# Patient Record
Sex: FEMALE | Race: WHITE | NOT HISPANIC OR LATINO
[De-identification: names, ages, dates, MRNs, and addresses within clinical notes are randomized per-mention and may not be internally consistent; named-entity substitution may affect disease eponyms.]

---

## 2017-12-18 PROBLEM — Z00.00 ENCOUNTER FOR PREVENTIVE HEALTH EXAMINATION: Status: ACTIVE | Noted: 2017-12-18

## 2018-01-17 ENCOUNTER — LABORATORY RESULT (OUTPATIENT)
Age: 63
End: 2018-01-17

## 2018-01-17 ENCOUNTER — APPOINTMENT (OUTPATIENT)
Dept: SURGERY | Facility: CLINIC | Age: 63
End: 2018-01-17
Payer: COMMERCIAL

## 2018-01-17 VITALS
SYSTOLIC BLOOD PRESSURE: 134 MMHG | HEART RATE: 84 BPM | HEIGHT: 67 IN | WEIGHT: 160 LBS | DIASTOLIC BLOOD PRESSURE: 84 MMHG | BODY MASS INDEX: 25.11 KG/M2

## 2018-01-17 DIAGNOSIS — D05.12 INTRADUCTAL CARCINOMA IN SITU OF LEFT BREAST: ICD-10-CM

## 2018-01-17 DIAGNOSIS — C44.629 SQUAMOUS CELL CARCINOMA OF SKIN OF LEFT UPPER LIMB, INCLUDING SHOULDER: ICD-10-CM

## 2018-01-17 DIAGNOSIS — Z87.891 PERSONAL HISTORY OF NICOTINE DEPENDENCE: ICD-10-CM

## 2018-01-17 PROCEDURE — 10022: CPT

## 2018-01-17 PROCEDURE — 76942 ECHO GUIDE FOR BIOPSY: CPT

## 2018-01-17 PROCEDURE — 99243 OFF/OP CNSLTJ NEW/EST LOW 30: CPT

## 2018-01-17 RX ORDER — FENOFIBRATE 145 MG/1
TABLET ORAL
Refills: 0 | Status: ACTIVE | COMMUNITY

## 2018-01-17 RX ORDER — ALPRAZOLAM 2 MG/1
TABLET ORAL
Refills: 0 | Status: ACTIVE | COMMUNITY

## 2018-01-17 RX ORDER — NORTRIPTYLINE HYDROCHLORIDE 75 MG/1
CAPSULE ORAL
Refills: 0 | Status: ACTIVE | COMMUNITY

## 2018-01-17 RX ORDER — FAMOTIDINE 40 MG/1
TABLET, FILM COATED ORAL
Refills: 0 | Status: ACTIVE | COMMUNITY

## 2018-01-19 PROBLEM — C44.629 SQUAMOUS CELL CARCINOMA OF LEFT HAND: Status: RESOLVED | Noted: 2018-01-19 | Resolved: 2018-01-19

## 2018-01-19 PROBLEM — Z87.891 FORMER SMOKER: Status: ACTIVE | Noted: 2018-01-19

## 2018-01-19 PROBLEM — D05.12 DUCTAL CARCINOMA IN SITU (DCIS) OF LEFT BREAST: Status: RESOLVED | Noted: 2018-01-19 | Resolved: 2018-01-19

## 2023-07-25 ENCOUNTER — LABORATORY RESULT (OUTPATIENT)
Age: 68
End: 2023-07-25

## 2023-07-25 ENCOUNTER — APPOINTMENT (OUTPATIENT)
Dept: SURGERY | Facility: CLINIC | Age: 68
End: 2023-07-25
Payer: MEDICARE

## 2023-07-25 VITALS
HEIGHT: 67 IN | SYSTOLIC BLOOD PRESSURE: 130 MMHG | WEIGHT: 165 LBS | BODY MASS INDEX: 25.9 KG/M2 | DIASTOLIC BLOOD PRESSURE: 85 MMHG

## 2023-07-25 DIAGNOSIS — E04.9 NONTOXIC GOITER, UNSPECIFIED: ICD-10-CM

## 2023-07-25 DIAGNOSIS — E04.2 NONTOXIC MULTINODULAR GOITER: ICD-10-CM

## 2023-07-25 DIAGNOSIS — Z87.09 PERSONAL HISTORY OF OTHER DISEASES OF THE RESPIRATORY SYSTEM: ICD-10-CM

## 2023-07-25 DIAGNOSIS — Z86.79 PERSONAL HISTORY OF OTHER DISEASES OF THE CIRCULATORY SYSTEM: ICD-10-CM

## 2023-07-25 DIAGNOSIS — Z86.59 PERSONAL HISTORY OF OTHER MENTAL AND BEHAVIORAL DISORDERS: ICD-10-CM

## 2023-07-25 DIAGNOSIS — Z87.19 PERSONAL HISTORY OF OTHER DISEASES OF THE DIGESTIVE SYSTEM: ICD-10-CM

## 2023-07-25 PROCEDURE — 99203 OFFICE O/P NEW LOW 30 MIN: CPT

## 2023-07-25 PROCEDURE — 10005 FNA BX W/US GDN 1ST LES: CPT

## 2023-07-27 PROBLEM — Z87.19 HISTORY OF ISCHEMIC COLITIS: Status: RESOLVED | Noted: 2023-07-27 | Resolved: 2023-07-27

## 2023-07-27 PROBLEM — Z86.79 HISTORY OF HYPERTENSION: Status: RESOLVED | Noted: 2023-07-27 | Resolved: 2023-07-27

## 2023-07-27 PROBLEM — Z87.09 HISTORY OF ASTHMA: Status: RESOLVED | Noted: 2023-07-27 | Resolved: 2023-07-27

## 2023-07-27 PROBLEM — Z86.59 HISTORY OF ANXIETY: Status: RESOLVED | Noted: 2023-07-27 | Resolved: 2023-07-27

## 2023-07-27 RX ORDER — DILTIAZEM HYDROCHLORIDE 120 MG/1
120 TABLET ORAL
Refills: 0 | Status: ACTIVE | COMMUNITY

## 2023-07-27 NOTE — REASON FOR VISIT
[Initial Consultation] : an initial consultation for [FreeTextEntry2] : [facility] [Procedure: _________] : a [unfilled] procedure visit [Other: _____] : [unfilled]

## 2023-07-27 NOTE — HISTORY OF PRESENT ILLNESS
[de-identified] : Patient referred by Dr. Hobbs for evaluation of multinodular goiter.  Patient with 4-year history of thyroid nodules.  Denies dysphagia, change in voice or radiation exposure.  Prior biopsy left isthmus nodule January 2019 benign.  Follow-up ultrasound December 2022: Right lobe 4.9 x 1.1 x 2.1 cm with right isthmus nodule 1.8 x 1.6 x 0.9 cm, TR 4.  Left lobe 4.7 x 1.2 x 1.1 cm with left isthmus 8 x 7 x 7 mm nodule.  I have reviewed all old and new data and available images.

## 2023-07-27 NOTE — CONSULT LETTER
[Dear  ___] : Dear  [unfilled], [Consult Letter:] : I had the pleasure of evaluating your patient, [unfilled]. [Please see my note below.] : Please see my note below. [Consult Closing:] : Thank you very much for allowing me to participate in the care of this patient.  If you have any questions, please do not hesitate to contact me. [Sincerely,] : Sincerely, [FreeTextEntry2] : Dr. Salina Hobbs  [FreeTextEntry3] : Nicole Chase MD, FACS\par Assistant Professor of Surgery and Otolaryngology\par [facility]\par

## 2023-07-27 NOTE — HISTORY OF PRESENT ILLNESS
[de-identified] : Patient referred by Dr. Hobbs for evaluation of multinodular goiter.  Patient with 4-year history of thyroid nodules.  Denies dysphagia, change in voice or radiation exposure.  Prior biopsy left isthmus nodule January 2019 benign.  Follow-up ultrasound December 2022: Right lobe 4.9 x 1.1 x 2.1 cm with right isthmus nodule 1.8 x 1.6 x 0.9 cm, TR 4.  Left lobe 4.7 x 1.2 x 1.1 cm with left isthmus 8 x 7 x 7 mm nodule.  I have reviewed all old and new data and available images.

## 2023-07-27 NOTE — PROCEDURE
[FreeTextEntry1] : US guided thyroid FNA [FreeTextEntry2] : right isthmus nodule 1.8 cm  [FreeTextEntry3] : Patient for thyroid biopsy. Risks benefits and alternatives discussed including bleeding, infection, swelling and need for repeat biopsy. Questions answered.\par Consent obtained, timeout taken.\par \par Patient supine.\par No anesthetic used. \par Nodule localized with US guidance\par Sterile technique with Betadine skin prep.\par 22-gauge needle under ultrasound guidance with a single pass.\par Slides prepared sent to cytology.\par \par Post procedure:\par Hemostasis obtained, patient tolerated well, no complications.\par Post procedure instructions given.\par

## 2023-07-27 NOTE — PHYSICAL EXAM
[de-identified] : no cervical or supraclavicular adenopathy, trachea midline, thyroid without enlargement with smooth right isthmus nodule 1.8 cm.  [Normal] : no neck adenopathy [de-identified] : Skin:  normal appearance.  no rash, nodules, vesicles, or erythema,\par Musculoskeletal:  full range of motion and no deformities appreciated\par Neurological:  grossly intact\par Psychiatric:  oriented to person, place and time with appropriate affect

## 2023-07-27 NOTE — ASSESSMENT
[FreeTextEntry1] : Patient with history of thyroid nodules with prior benign biopsy.  I performed a repeat biopsy of her dominant right isthmus nodule in the office today.  Please see separate procedure note.  The patient will contact my office to review results.  If cytology is benign, I have recommended a repeat ultrasound December 2023, RTO 6 months.  I have answered her questions to the best my ability.

## 2023-07-27 NOTE — PHYSICAL EXAM
[de-identified] : no cervical or supraclavicular adenopathy, trachea midline, thyroid without enlargement with smooth right isthmus nodule 1.8 cm.  [Normal] : no neck adenopathy [de-identified] : Skin:  normal appearance.  no rash, nodules, vesicles, or erythema,\par Musculoskeletal:  full range of motion and no deformities appreciated\par Neurological:  grossly intact\par Psychiatric:  oriented to person, place and time with appropriate affect

## 2023-07-27 NOTE — CONSULT LETTER
[Dear  ___] : Dear  [unfilled], [Consult Letter:] : I had the pleasure of evaluating your patient, [unfilled]. [Please see my note below.] : Please see my note below. [Consult Closing:] : Thank you very much for allowing me to participate in the care of this patient.  If you have any questions, please do not hesitate to contact me. [Sincerely,] : Sincerely, [FreeTextEntry2] : Dr. Salina Hobbs  [FreeTextEntry3] : Nicole Chase MD, FACS\par Assistant Professor of Surgery and Otolaryngology\par [facility]\par

## 2023-08-02 ENCOUNTER — NON-APPOINTMENT (OUTPATIENT)
Age: 68
End: 2023-08-02

## 2023-12-15 ENCOUNTER — OFFICE (OUTPATIENT)
Dept: URBAN - METROPOLITAN AREA CLINIC 103 | Facility: CLINIC | Age: 68
Setting detail: OPHTHALMOLOGY
End: 2023-12-15
Payer: MEDICARE

## 2023-12-15 DIAGNOSIS — H43.812: ICD-10-CM

## 2023-12-15 DIAGNOSIS — H35.033: ICD-10-CM

## 2023-12-15 DIAGNOSIS — H25.13: ICD-10-CM

## 2023-12-15 DIAGNOSIS — H35.371: ICD-10-CM

## 2023-12-15 DIAGNOSIS — H43.391: ICD-10-CM

## 2023-12-15 DIAGNOSIS — H52.7: ICD-10-CM

## 2023-12-15 DIAGNOSIS — H40.013: ICD-10-CM

## 2023-12-15 PROCEDURE — 92250 FUNDUS PHOTOGRAPHY W/I&R: CPT | Performed by: OPHTHALMOLOGY

## 2023-12-15 PROCEDURE — 76514 ECHO EXAM OF EYE THICKNESS: CPT | Performed by: OPHTHALMOLOGY

## 2023-12-15 PROCEDURE — 92015 DETERMINE REFRACTIVE STATE: CPT | Performed by: OPHTHALMOLOGY

## 2023-12-15 PROCEDURE — 92004 COMPRE OPH EXAM NEW PT 1/>: CPT | Performed by: OPHTHALMOLOGY

## 2023-12-15 ASSESSMENT — CONFRONTATIONAL VISUAL FIELD TEST (CVF)
OD_FINDINGS: FULL
OS_FINDINGS: FULL

## 2023-12-15 ASSESSMENT — REFRACTION_AUTOREFRACTION
OD_SPHERE: +0.25
OD_CYLINDER: 0.00
OS_AXIS: 141
OD_AXIS: 000
OS_CYLINDER: -0.75
OS_SPHERE: +0.50

## 2023-12-15 ASSESSMENT — REFRACTION_MANIFEST
OD_CYLINDER: SPH
OS_CYLINDER: -1.00
OS_ADD: +2.75
OS_VA1: 20/25+
OS_SPHERE: +1.00
OD_ADD: +2.75
OD_SPHERE: +0.25
OS_AXIS: 135
OD_VA1: 20/20

## 2023-12-15 ASSESSMENT — REFRACTION_CURRENTRX
OD_AXIS: 000
OD_ADD: +2.75
OS_CYLINDER: 0.00
OD_CYLINDER: 0.00
OS_OVR_VA: 20/
OS_ADD: +2.75
OD_VPRISM_DIRECTION: BF
OD_SPHERE: PLANO
OS_AXIS: 000
OS_VPRISM_DIRECTION: BF
OD_OVR_VA: 20/
OS_SPHERE: PLANO

## 2023-12-15 ASSESSMENT — SPHEQUIV_DERIVED
OD_SPHEQUIV: 0.25
OS_SPHEQUIV: 0.5
OS_SPHEQUIV: 0.125

## 2024-02-20 ENCOUNTER — NON-APPOINTMENT (OUTPATIENT)
Age: 69
End: 2024-02-20

## 2024-06-20 ENCOUNTER — OFFICE (OUTPATIENT)
Dept: URBAN - METROPOLITAN AREA CLINIC 103 | Facility: CLINIC | Age: 69
Setting detail: OPHTHALMOLOGY
End: 2024-06-20
Payer: MEDICARE

## 2024-06-20 DIAGNOSIS — H52.7: ICD-10-CM

## 2024-06-20 DIAGNOSIS — H40.013: ICD-10-CM

## 2024-06-20 DIAGNOSIS — H25.13: ICD-10-CM

## 2024-06-20 PROCEDURE — 92015 DETERMINE REFRACTIVE STATE: CPT | Performed by: OPHTHALMOLOGY

## 2024-06-20 PROCEDURE — 92133 CPTRZD OPH DX IMG PST SGM ON: CPT | Performed by: OPHTHALMOLOGY

## 2024-06-20 PROCEDURE — 92083 EXTENDED VISUAL FIELD XM: CPT | Performed by: OPHTHALMOLOGY

## 2024-06-20 PROCEDURE — 92012 INTRM OPH EXAM EST PATIENT: CPT | Performed by: OPHTHALMOLOGY

## 2024-06-20 ASSESSMENT — CONFRONTATIONAL VISUAL FIELD TEST (CVF)
OS_FINDINGS: FULL
OD_FINDINGS: FULL

## 2024-07-23 ENCOUNTER — APPOINTMENT (OUTPATIENT)
Dept: SURGERY | Facility: CLINIC | Age: 69
End: 2024-07-23
Payer: MEDICARE

## 2024-07-23 DIAGNOSIS — E04.2 NONTOXIC MULTINODULAR GOITER: ICD-10-CM

## 2024-07-23 DIAGNOSIS — E04.9 NONTOXIC GOITER, UNSPECIFIED: ICD-10-CM

## 2024-07-23 PROCEDURE — G2211 COMPLEX E/M VISIT ADD ON: CPT

## 2024-07-23 PROCEDURE — 99213 OFFICE O/P EST LOW 20 MIN: CPT

## 2024-07-23 NOTE — HISTORY OF PRESENT ILLNESS
[de-identified] : Patient referred by Dr. Hobbs for evaluation of multinodular goiter.  Patient with 4-year history of thyroid nodules.  Denies dysphagia, change in voice or radiation exposure.  Prior biopsy left isthmus nodule January 2019 benign.  Follow-up ultrasound December 2022: Right lobe 4.9 x 1.1 x 2.1 cm with right isthmus nodule 1.8 x 1.6 x 0.9 cm, TR 4.  Left lobe 4.7 x 1.2 x 1.1 cm with left isthmus 8 x 7 x 7 mm nodule. FNA right isthmus nodule, bening. stable oN US 2/2024. denies symptoms or recent illness.  I have reviewed all old and new data and available images.

## 2024-07-23 NOTE — PHYSICAL EXAM
[de-identified] : no cervical or supraclavicular adenopathy, trachea midline, thyroid without enlargement with smooth right isthmus nodule 1.8 cm.  [Normal] : no neck adenopathy [de-identified] : Skin:  normal appearance.  no rash, nodules, vesicles, or erythema,\par  Musculoskeletal:  full range of motion and no deformities appreciated\par  Neurological:  grossly intact\par  Psychiatric:  oriented to person, place and time with appropriate affect

## 2024-07-23 NOTE — ASSESSMENT
[FreeTextEntry1] : Patient with history of thyroid nodules with prior benign biopsy.  biopsy of her dominant right isthmus nodule  benign, I have recommended a repeat now, if atable, RTO 6 months.  I have answered her questions to the best my ability.